# Patient Record
Sex: FEMALE | Race: WHITE | HISPANIC OR LATINO | Employment: FULL TIME | URBAN - METROPOLITAN AREA
[De-identification: names, ages, dates, MRNs, and addresses within clinical notes are randomized per-mention and may not be internally consistent; named-entity substitution may affect disease eponyms.]

---
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
## 2024-04-23 NOTE — PATIENT INSTRUCTIONS
1.  Please return to clinic at your next scheduled visit.  Contact the clinic ([phone]) at least 24 hours prior in the event you need to cancel.  2.  Do no harm to yourself or others.    3.  Avoid alcohol and drugs.    4.  Take all medications as prescribed.  Please contact the clinic with any concerns. If you are in need of medication refills, please call the clinic at [phone].    5. Should you want to get in touch with your provider, Dr. Luis Alberto Allred, please utilize [facility] or contact the office ([phone]), and staff will be able to page Dr. Allred directly.  6.  In the event you have personal crisis, contact the following crisis numbers: Suicide Prevention Hotline 1-951.829.9042; [facility] Helpline 1-249-181-YASMANI; [facility] Emergency Room [phone]; text HELLO to 408564; or 801.
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
## 2024-04-24 ENCOUNTER — OFFICE VISIT (OUTPATIENT)
Dept: PSYCHIATRY | Facility: CLINIC | Age: 19
End: 2024-04-24

## 2024-04-24 VITALS
DIASTOLIC BLOOD PRESSURE: 70 MMHG | WEIGHT: 201.6 LBS | SYSTOLIC BLOOD PRESSURE: 116 MMHG | HEIGHT: 65 IN | BODY MASS INDEX: 33.59 KG/M2 | HEART RATE: 83 BPM

## 2024-04-24 DIAGNOSIS — F51.05 INSOMNIA DUE TO MENTAL CONDITION: ICD-10-CM

## 2024-04-24 DIAGNOSIS — F31.81 MODERATE DEPRESSED BIPOLAR II DISORDER: Primary | ICD-10-CM

## 2024-04-24 DIAGNOSIS — F41.1 GENERALIZED ANXIETY DISORDER: ICD-10-CM

## 2024-04-24 RX ORDER — LAMOTRIGINE 100 MG/1
100 TABLET ORAL DAILY
Qty: 30 TABLET | Refills: 2 | Status: SHIPPED | OUTPATIENT
Start: 2024-04-24 | End: 2024-04-24 | Stop reason: SDUPTHER

## 2024-04-24 RX ORDER — LAMOTRIGINE 100 MG/1
100 TABLET ORAL DAILY
Qty: 30 TABLET | Refills: 2 | Status: SHIPPED | OUTPATIENT
Start: 2024-04-24

## 2024-04-24 RX ORDER — QUETIAPINE FUMARATE 25 MG/1
25 TABLET, FILM COATED ORAL NIGHTLY
COMMUNITY

## 2024-04-24 NOTE — TREATMENT PLAN
Multi-Disciplinary Problems (from Behavioral Health Treatment Plan)      Active Problems       Problem: Anxiety  Start Date: 04/24/24      Problem Details: The patient self-scales this problem as a 6 with 10 being the worst.          Goal Priority Start Date Expected End Date End Date    Patient will develop and implement behavioral and cognitive strategies to reduce anxiety and irrational fears. -- 04/24/24 -- --    Goal Details: Progress toward goal:  Not appropriate to rate progress toward goal since this is the initial treatment plan.          Goal Intervention Frequency Start Date End Date    Help patient explore past emotional issues in relation to present anxiety. Q Month 04/24/24 --    Intervention Details: Duration of treatment until until remission of symptoms.          Goal Intervention Frequency Start Date End Date    Help patient develop an awareness of their cognitive and physical responses to anxiety. Q Month 04/24/24 --    Intervention Details: Duration of treatment until until remission of symptoms.                  Problem: Depression  Start Date: 04/24/24      Problem Details: The patient self-scales this problem as a 4 with 10 being the worst.          Goal Priority Start Date Expected End Date End Date    Patient will demonstrate the ability to initiate new constructive life skills outside of sessions on a consistent basis. -- 04/24/24 -- --    Goal Details: Progress toward goal:  Not appropriate to rate progress toward goal since this is the initial treatment plan.          Goal Intervention Frequency Start Date End Date    Assist patient in setting attainable activities of daily living goals. PRN 04/24/24 --      Goal Intervention Frequency Start Date End Date    Provide education about depression Q Month 04/24/24 --    Intervention Details: Duration of treatment until until remission of symptoms.          Goal Intervention Frequency Start Date End Date    Assist patient in developing healthy  coping strategies. Q Month 04/24/24 --    Intervention Details: Duration of treatment until until remission of symptoms.                          Reviewed By       Luis Alberto Allred MD 04/24/24 [id number]                     I have discussed and reviewed this treatment plan with the patient.

## 2024-05-07 ENCOUNTER — OFFICE VISIT (OUTPATIENT)
Dept: PSYCHIATRY | Facility: CLINIC | Age: 19
End: 2024-05-07
Payer: MEDICAID

## 2024-05-07 VITALS
BODY MASS INDEX: 33.66 KG/M2 | HEART RATE: 80 BPM | WEIGHT: 202 LBS | DIASTOLIC BLOOD PRESSURE: 81 MMHG | SYSTOLIC BLOOD PRESSURE: 122 MMHG | HEIGHT: 65 IN

## 2024-05-07 DIAGNOSIS — F51.05 INSOMNIA DUE TO MENTAL CONDITION: ICD-10-CM

## 2024-05-07 DIAGNOSIS — F31.81 MODERATE DEPRESSED BIPOLAR II DISORDER: Primary | ICD-10-CM

## 2024-05-07 DIAGNOSIS — F41.1 GENERALIZED ANXIETY DISORDER: ICD-10-CM

## 2024-05-07 RX ORDER — QUETIAPINE FUMARATE 50 MG/1
50 TABLET, FILM COATED ORAL NIGHTLY
Qty: 30 TABLET | Refills: 2 | Status: SHIPPED | OUTPATIENT
Start: 2024-05-07

## 2024-05-30 ENCOUNTER — TELEPHONE (OUTPATIENT)
Dept: BEHAVIORAL HEALTH | Facility: CLINIC | Age: 19
End: 2024-05-30
Payer: MEDICAID

## 2024-05-30 NOTE — TELEPHONE ENCOUNTER
RELAYED MESSAGE TO PATIENTS STEP MOTHER. AGREED WITH PROVIDERS PLAN. NO FURTHER QUESTIONS OR CONCERNS AT THIS TIME

## 2024-05-30 NOTE — TELEPHONE ENCOUNTER
I'd recommend she try 1.5 (one and a half) tablets by mouth nightly first, then 2 if that doesn't work.

## 2024-05-30 NOTE — TELEPHONE ENCOUNTER
SPOKE WITH PATIENTS STEP MOM OSCAR IN REGARDS TO MEDICATION. PATIENT STATES HER SEROQUEL IS NOT HELPING SLEEP AT NIGHT. PATIENT HAS A FOLLOW UP ON 06/13/24 WITH DR ALLRED. ROUTING TO COVERING PROVIDER FOR REVIEW.   QUEtiapine (SEROquel) 50 MG tablet (05/07/2024)     NEXT FOLLOW UP   Appointment with Luis Alberto Allred MD (06/13/2024)

## 2024-06-13 ENCOUNTER — OFFICE VISIT (OUTPATIENT)
Dept: PSYCHIATRY | Facility: CLINIC | Age: 19
End: 2024-06-13
Payer: MEDICAID

## 2024-06-13 VITALS
WEIGHT: 198.4 LBS | BODY MASS INDEX: 33.05 KG/M2 | DIASTOLIC BLOOD PRESSURE: 79 MMHG | SYSTOLIC BLOOD PRESSURE: 119 MMHG | HEIGHT: 65 IN | HEART RATE: 88 BPM

## 2024-06-13 DIAGNOSIS — F31.81 MODERATE DEPRESSED BIPOLAR II DISORDER: Primary | ICD-10-CM

## 2024-06-13 DIAGNOSIS — F41.1 GENERALIZED ANXIETY DISORDER: ICD-10-CM

## 2024-06-13 DIAGNOSIS — F51.05 INSOMNIA DUE TO MENTAL CONDITION: ICD-10-CM

## 2024-06-13 RX ORDER — QUETIAPINE FUMARATE 100 MG/1
100 TABLET, FILM COATED ORAL NIGHTLY
Qty: 30 TABLET | Refills: 2 | Status: SHIPPED | OUTPATIENT
Start: 2024-06-13

## 2024-06-13 RX ORDER — LAMOTRIGINE 200 MG/1
200 TABLET ORAL DAILY
Qty: 30 TABLET | Refills: 2 | Status: SHIPPED | OUTPATIENT
Start: 2024-06-13

## 2024-06-13 NOTE — PATIENT INSTRUCTIONS
1.  Please return to clinic at your next scheduled visit.  Contact the clinic ([phone]) at least 24 hours prior in the event you need to cancel.  2.  Do no harm to yourself or others.    3.  Avoid alcohol and drugs.    4.  Take all medications as prescribed.  Please contact the clinic with any concerns. If you are in need of medication refills, please call the clinic at [phone].    5. Should you want to get in touch with your provider, Dr. Luis Alberto Allred, please utilize [employer] or contact the office ([phone]), and staff will be able to page Dr. Allred directly.  6.  In the event you have personal crisis, contact the following crisis numbers: Suicide Prevention Hotline 1-607.374.9679; [facility] Helpline 5-923-857-YASMANI; [facility] Emergency Room [phone]; text HELLO to 934687; or 714.
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
## 2024-07-16 ENCOUNTER — OFFICE VISIT (OUTPATIENT)
Dept: PSYCHIATRY | Facility: CLINIC | Age: 19
End: 2024-07-16
Payer: MEDICAID

## 2024-07-16 VITALS
BODY MASS INDEX: 32.59 KG/M2 | DIASTOLIC BLOOD PRESSURE: 76 MMHG | HEIGHT: 65 IN | HEART RATE: 84 BPM | SYSTOLIC BLOOD PRESSURE: 115 MMHG | WEIGHT: 195.6 LBS

## 2024-07-16 DIAGNOSIS — F31.81 MODERATE DEPRESSED BIPOLAR II DISORDER: Primary | ICD-10-CM

## 2024-07-16 DIAGNOSIS — F41.1 GENERALIZED ANXIETY DISORDER: ICD-10-CM

## 2024-07-16 DIAGNOSIS — F51.05 INSOMNIA DUE TO MENTAL CONDITION: ICD-10-CM

## 2024-07-16 PROCEDURE — 90833 PSYTX W PT W E/M 30 MIN: CPT | Performed by: STUDENT IN AN ORGANIZED HEALTH CARE EDUCATION/TRAINING PROGRAM

## 2024-07-16 PROCEDURE — 99214 OFFICE O/P EST MOD 30 MIN: CPT | Performed by: STUDENT IN AN ORGANIZED HEALTH CARE EDUCATION/TRAINING PROGRAM

## 2024-07-16 PROCEDURE — 1160F RVW MEDS BY RX/DR IN RCRD: CPT | Performed by: STUDENT IN AN ORGANIZED HEALTH CARE EDUCATION/TRAINING PROGRAM

## 2024-07-16 PROCEDURE — 1159F MED LIST DOCD IN RCRD: CPT | Performed by: STUDENT IN AN ORGANIZED HEALTH CARE EDUCATION/TRAINING PROGRAM

## 2024-07-16 RX ORDER — HYDROXYZINE HYDROCHLORIDE 25 MG/1
25 TABLET, FILM COATED ORAL 3 TIMES DAILY PRN
Qty: 90 TABLET | Refills: 2 | Status: SHIPPED | OUTPATIENT
Start: 2024-07-16
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
## 2024-07-16 NOTE — PATIENT INSTRUCTIONS
1.  Please return to clinic at your next scheduled visit.  Contact the clinic ([phone]) at least 24 hours prior in the event you need to cancel.  2.  Do no harm to yourself or others.    3.  Avoid alcohol and drugs.    4.  Take all medications as prescribed.  Please contact the clinic with any concerns. If you are in need of medication refills, please call the clinic at [phone].    5. Should you want to get in touch with your provider, Dr. Luis Alberto Allred, please utilize [facility] or contact the office ([phone]), and staff will be able to page Dr. Allred directly.  6.  In the event you have personal crisis, contact the following crisis numbers: Suicide Prevention Hotline 1-765.558.6306; [facility]MAN[facility] Helpline 1-282-202-YASMANI; [facility] Emergency Room [phone]; text HELLO to 658255; or 796.

## 2024-07-29 ENCOUNTER — HOSPITAL ENCOUNTER (EMERGENCY)
Facility: HOSPITAL | Age: 19
Discharge: HOME | End: 2024-07-29
Attending: EMERGENCY MEDICINE
Payer: MEDICAID

## 2024-07-29 VITALS
DIASTOLIC BLOOD PRESSURE: 77 MMHG | HEART RATE: 83 BPM | BODY MASS INDEX: 33.43 KG/M2 | OXYGEN SATURATION: 98 % | TEMPERATURE: 97.5 F | SYSTOLIC BLOOD PRESSURE: 106 MMHG | HEIGHT: 65 IN | WEIGHT: 200.62 LBS | RESPIRATION RATE: 16 BRPM

## 2024-07-29 DIAGNOSIS — R51.9 NONINTRACTABLE HEADACHE, UNSPECIFIED CHRONICITY PATTERN, UNSPECIFIED HEADACHE TYPE: Primary | ICD-10-CM

## 2024-07-29 DIAGNOSIS — K21.9 GASTROESOPHAGEAL REFLUX DISEASE, UNSPECIFIED WHETHER ESOPHAGITIS PRESENT: ICD-10-CM

## 2024-07-29 LAB
ALBUMIN SERPL-MCNC: 4.4 G/DL (ref 3.5–5.2)
ALBUMIN/GLOB SERPL: 1.5 G/DL
ALP SERPL-CCNC: 158 U/L (ref 39–117)
ALT SERPL W P-5'-P-CCNC: 13 U/L (ref 1–33)
ANION GAP SERPL CALCULATED.3IONS-SCNC: 10.9 MMOL/L (ref 5–15)
AST SERPL-CCNC: 26 U/L (ref 1–32)
BASOPHILS # BLD AUTO: 0.02 10*3/MM3 (ref 0–0.2)
BASOPHILS NFR BLD AUTO: 0.4 % (ref 0–1.5)
BILIRUB SERPL-MCNC: 0.2 MG/DL (ref 0–1.2)
BILIRUB UR QL STRIP: NEGATIVE
BUN SERPL-MCNC: 9 MG/DL (ref 6–20)
BUN/CREAT SERPL: 12.2 (ref 7–25)
CALCIUM SPEC-SCNC: 9.4 MG/DL (ref 8.6–10.5)
CHLORIDE SERPL-SCNC: 104 MMOL/L (ref 98–107)
CLARITY UR: CLEAR
CO2 SERPL-SCNC: 25.1 MMOL/L (ref 22–29)
COLOR UR: YELLOW
CREAT SERPL-MCNC: 0.74 MG/DL (ref 0.57–1)
DEPRECATED RDW RBC AUTO: 43.7 FL (ref 37–54)
EGFRCR SERPLBLD CKD-EPI 2021: 118.2 ML/MIN/1.73
EOSINOPHIL # BLD AUTO: 0.31 10*3/MM3 (ref 0–0.4)
EOSINOPHIL NFR BLD AUTO: 6.8 % (ref 0.3–6.2)
ERYTHROCYTE [DISTWIDTH] IN BLOOD BY AUTOMATED COUNT: 13.6 % (ref 12.3–15.4)
FLUAV SUBTYP SPEC NAA+PROBE: NOT DETECTED
FLUBV RNA ISLT QL NAA+PROBE: NOT DETECTED
GLOBULIN UR ELPH-MCNC: 2.9 GM/DL
GLUCOSE SERPL-MCNC: 100 MG/DL (ref 65–99)
GLUCOSE UR STRIP-MCNC: NEGATIVE MG/DL
HCT VFR BLD AUTO: 42.5 % (ref 34–46.6)
HGB BLD-MCNC: 13.4 G/DL (ref 12–15.9)
HGB UR QL STRIP.AUTO: NEGATIVE
HOLD SPECIMEN: NORMAL
HOLD SPECIMEN: NORMAL
IMM GRANULOCYTES # BLD AUTO: 0.01 10*3/MM3 (ref 0–0.05)
IMM GRANULOCYTES NFR BLD AUTO: 0.2 % (ref 0–0.5)
KETONES UR QL STRIP: NEGATIVE
LEUKOCYTE ESTERASE UR QL STRIP.AUTO: NEGATIVE
LYMPHOCYTES # BLD AUTO: 1.17 10*3/MM3 (ref 0.7–3.1)
LYMPHOCYTES NFR BLD AUTO: 25.6 % (ref 19.6–45.3)
MCH RBC QN AUTO: 27.6 PG (ref 26.6–33)
MCHC RBC AUTO-ENTMCNC: 31.5 G/DL (ref 31.5–35.7)
MCV RBC AUTO: 87.6 FL (ref 79–97)
MONOCYTES # BLD AUTO: 0.37 10*3/MM3 (ref 0.1–0.9)
MONOCYTES NFR BLD AUTO: 8.1 % (ref 5–12)
NEUTROPHILS NFR BLD AUTO: 2.69 10*3/MM3 (ref 1.7–7)
NEUTROPHILS NFR BLD AUTO: 58.9 % (ref 42.7–76)
NITRITE UR QL STRIP: NEGATIVE
NRBC BLD AUTO-RTO: 0 /100 WBC (ref 0–0.2)
PH UR STRIP.AUTO: 6.5 [PH] (ref 5–8)
PLATELET # BLD AUTO: 216 10*3/MM3 (ref 140–450)
PMV BLD AUTO: 10.1 FL (ref 6–12)
POTASSIUM SERPL-SCNC: 4.1 MMOL/L (ref 3.5–5.2)
PROT SERPL-MCNC: 7.3 G/DL (ref 6–8.5)
PROT UR QL STRIP: NEGATIVE
QT INTERVAL: 361 MS
QTC INTERVAL: 424 MS
RBC # BLD AUTO: 4.85 10*6/MM3 (ref 3.77–5.28)
RSV RNA NPH QL NAA+NON-PROBE: NOT DETECTED
SARS-COV-2 RNA RESP QL NAA+PROBE: NOT DETECTED
SODIUM SERPL-SCNC: 140 MMOL/L (ref 136–145)
SP GR UR STRIP: 1.02 (ref 1–1.03)
UROBILINOGEN UR QL STRIP: NORMAL
WBC NRBC COR # BLD AUTO: 4.57 10*3/MM3 (ref 3.4–10.8)
WHOLE BLOOD HOLD COAG: NORMAL
WHOLE BLOOD HOLD SPECIMEN: NORMAL

## 2024-07-29 PROCEDURE — 25810000003 SODIUM CHLORIDE 0.9 % SOLUTION

## 2024-07-29 PROCEDURE — 99284 EMERGENCY DEPT VISIT MOD MDM: CPT

## 2024-07-29 PROCEDURE — 81003 URINALYSIS AUTO W/O SCOPE: CPT | Performed by: EMERGENCY MEDICINE

## 2024-07-29 PROCEDURE — 85025 COMPLETE CBC W/AUTO DIFF WBC: CPT | Performed by: EMERGENCY MEDICINE

## 2024-07-29 PROCEDURE — 25010000002 DEXAMETHASONE SODIUM PHOSPHATE 10 MG/ML SOLUTION

## 2024-07-29 PROCEDURE — 96374 THER/PROPH/DIAG INJ IV PUSH: CPT

## 2024-07-29 PROCEDURE — 87637 SARSCOV2&INF A&B&RSV AMP PRB: CPT

## 2024-07-29 PROCEDURE — 93005 ELECTROCARDIOGRAM TRACING: CPT | Performed by: EMERGENCY MEDICINE

## 2024-07-29 PROCEDURE — 80053 COMPREHEN METABOLIC PANEL: CPT | Performed by: EMERGENCY MEDICINE

## 2024-07-29 PROCEDURE — 96375 TX/PRO/DX INJ NEW DRUG ADDON: CPT

## 2024-07-29 PROCEDURE — 25010000002 KETOROLAC TROMETHAMINE PER 15 MG

## 2024-07-29 RX ORDER — LIDOCAINE HYDROCHLORIDE 20 MG/ML
10 SOLUTION OROPHARYNGEAL ONCE
Status: COMPLETED | OUTPATIENT
Start: 2024-07-29 | End: 2024-07-29

## 2024-07-29 RX ORDER — DEXAMETHASONE SODIUM PHOSPHATE 10 MG/ML
10 INJECTION, SOLUTION INTRAMUSCULAR; INTRAVENOUS ONCE
Status: COMPLETED | OUTPATIENT
Start: 2024-07-29 | End: 2024-07-29

## 2024-07-29 RX ORDER — FAMOTIDINE 20 MG/1
20 TABLET, FILM COATED ORAL DAILY
Qty: 10 TABLET | Refills: 0 | Status: SHIPPED | OUTPATIENT
Start: 2024-07-29 | End: 2024-08-08

## 2024-07-29 RX ORDER — ALUMINA, MAGNESIA, AND SIMETHICONE 2400; 2400; 240 MG/30ML; MG/30ML; MG/30ML
15 SUSPENSION ORAL ONCE
Status: COMPLETED | OUTPATIENT
Start: 2024-07-29 | End: 2024-07-29

## 2024-07-29 RX ORDER — NAPROXEN 500 MG/1
500 TABLET ORAL 2 TIMES DAILY PRN
Qty: 20 TABLET | Refills: 0 | Status: SHIPPED | OUTPATIENT
Start: 2024-07-29

## 2024-07-29 RX ORDER — KETOROLAC TROMETHAMINE 30 MG/ML
30 INJECTION, SOLUTION INTRAMUSCULAR; INTRAVENOUS ONCE
Status: COMPLETED | OUTPATIENT
Start: 2024-07-29 | End: 2024-07-29

## 2024-07-29 RX ORDER — SODIUM CHLORIDE 0.9 % (FLUSH) 0.9 %
10 SYRINGE (ML) INJECTION AS NEEDED
Status: DISCONTINUED | OUTPATIENT
Start: 2024-07-29 | End: 2024-07-29 | Stop reason: HOSPADM

## 2024-07-29 RX ADMIN — ALUMINUM HYDROXIDE, MAGNESIUM HYDROXIDE, DIMETHICONE 15 ML: 400; 400; 40 SUSPENSION ORAL at 12:07

## 2024-07-29 RX ADMIN — SODIUM CHLORIDE 1000 ML: 9 INJECTION, SOLUTION INTRAVENOUS at 10:43

## 2024-07-29 RX ADMIN — LIDOCAINE HYDROCHLORIDE 10 ML: 20 SOLUTION ORAL; TOPICAL at 12:07

## 2024-07-29 RX ADMIN — DEXAMETHASONE SODIUM PHOSPHATE 10 MG: 10 INJECTION INTRAMUSCULAR; INTRAVENOUS at 10:44

## 2024-07-29 RX ADMIN — KETOROLAC TROMETHAMINE 30 MG: 30 INJECTION, SOLUTION INTRAMUSCULAR; INTRAVENOUS at 10:44

## 2024-07-29 NOTE — DISCHARGE INSTRUCTIONS
Please note that all your lab work was within normal limits.  Based on your symptoms the numbness and tingling you are experiencing could be from a musculoskeletal/compression of a nerve in your neck.  I recommend speaking with your primary care provider about a possible MRI if you continue to have the symptoms.  Additionally take all your regular medications as prescribed.  If you develop the worst headache of your life, feel as if you are becoming confused, experience any one-sided weakness, or feel as if you are having a hard time finding your words and speaking please return to the ER immediately.  Otherwise follow-up with your primary care provider in 2 to 3 days.

## 2024-07-29 NOTE — ED PROVIDER NOTES
Time: 10:19 AM EDT  Date of encounter:  7/29/2024  Room number: 27/27  Independent Historian/Clinical History and Information was obtained by:   Patient    History is limited by: N/A    Chief Complaint: headache and dizziness       History of Present Illness:  Patient is a 19 y.o. year old female who presents to the emergency department for evaluation of headache, dizziness, and a burning sensation in her stomach.  Symptoms are reported to have started 2 to 3 days ago.  She has a history of migraines with upper extremity numbness and tingling    HPI    Patient Care Team  Primary Care Provider: Kim Cox APRN    Past Medical History:     Allergies   Allergen Reactions    Hydrocodone Hives    Adhesive Tape Hives     Medical and surgical tape causes itching and hives      Past Medical History:   Diagnosis Date    Anxiety     Bipolar depression Decemeber 2021     Past Surgical History:   Procedure Laterality Date    APPENDECTOMY       Family History   Problem Relation Age of Onset    Self-Injurious Behavior  Mother     Drug abuse Mother     Depression Mother     Bipolar disorder Mother     Anxiety disorder Mother     Alcohol abuse Father     No Known Problems Sister     ADD / ADHD Brother     Diabetes Maternal Aunt     Drug abuse Paternal Aunt     Hypertension Maternal Uncle     Thyroid disease Maternal Uncle     No Known Problems Paternal Uncle     Dementia Maternal Grandfather     Diabetes Maternal Grandfather     No Known Problems Maternal Grandmother     No Known Problems Paternal Grandfather     No Known Problems Paternal Grandmother     No Known Problems Cousin     No Known Problems Other     Depression Half-Sister     Anxiety disorder Half-Sister     OCD Neg Hx     Paranoid behavior Neg Hx     Schizophrenia Neg Hx     Seizures Neg Hx     Suicide Attempts Neg Hx        Home Medications:  Prior to Admission medications    Medication Sig Start Date End Date Taking? Authorizing Provider   hydrOXYzine  "(ATARAX) 25 MG tablet Take 1 tablet by mouth 3 (Three) Times a Day As Needed for Anxiety. 7/16/24   Luis Alberto Allred MD   lamoTRIgine (LaMICtal) 200 MG tablet Take 1 tablet by mouth Daily. 6/13/24   Luis Alberto Allred MD   QUEtiapine (SEROquel) 100 MG tablet Take 1 tablet by mouth Every Night. ONCE AT NIGHT 6/13/24   Luis Alberto Allred MD        Social History:   Social History     Tobacco Use    Smoking status: Never    Smokeless tobacco: Never   Vaping Use    Vaping status: Never Used   Substance Use Topics    Alcohol use: Never    Drug use: Never         Review of Systems:  Review of Systems   Constitutional:  Negative for chills and fever.   HENT:  Negative for congestion, ear pain and sore throat.    Eyes:  Negative for pain.   Respiratory:  Negative for cough, chest tightness and shortness of breath.    Cardiovascular:  Negative for chest pain.   Gastrointestinal:  Negative for abdominal pain, diarrhea, nausea and vomiting.        Burning in stomach   Genitourinary:  Negative for flank pain and hematuria.   Musculoskeletal:  Negative for joint swelling.   Skin:  Negative for pallor.   Neurological:  Positive for headaches. Negative for seizures.   All other systems reviewed and are negative.       Physical Exam:  /77   Pulse 83   Temp 97.5 °F (36.4 °C) (Oral)   Resp 16   Ht 165.1 cm (65\")   Wt 91 kg (200 lb 9.9 oz)   LMP 07/22/2024 (Approximate)   SpO2 98%   Breastfeeding No   BMI 33.38 kg/m²     Physical Exam  Vitals and nursing note reviewed.   Constitutional:       General: She is not in acute distress.     Appearance: Normal appearance. She is not ill-appearing, toxic-appearing or diaphoretic.   HENT:      Head: Normocephalic and atraumatic.      Mouth/Throat:      Mouth: Mucous membranes are moist.   Eyes:      General: No scleral icterus.     Extraocular Movements: Extraocular movements intact.      Pupils: Pupils are equal, round, and reactive to light.   Cardiovascular:      Rate and Rhythm: " Normal rate and regular rhythm.      Pulses: Normal pulses.      Heart sounds: Normal heart sounds.   Pulmonary:      Effort: Pulmonary effort is normal. No respiratory distress.      Breath sounds: Normal breath sounds. No stridor. No wheezing, rhonchi or rales.   Chest:      Chest wall: No tenderness.   Abdominal:      General: Abdomen is flat. There is no distension.      Palpations: Abdomen is soft.      Tenderness: There is no abdominal tenderness. There is no guarding.   Musculoskeletal:         General: No tenderness. Normal range of motion.      Cervical back: Normal range of motion and neck supple. No rigidity or tenderness.   Skin:     General: Skin is warm and dry.      Coloration: Skin is not jaundiced or pale.      Findings: No bruising, erythema, lesion or rash.   Neurological:      Mental Status: She is alert and oriented to person, place, and time. Mental status is at baseline.      Sensory: No sensory deficit.   Psychiatric:         Mood and Affect: Mood normal.         Behavior: Behavior normal.         Thought Content: Thought content normal.         Judgment: Judgment normal.                  Procedures:  Procedures      Medical Decision Making:      Comorbidities that affect care:    Migraines, bipolar,    External Notes reviewed:    Previous Radiological Studies: I reviewed the last cervical spine and lumbar spine x-rays that patient had completed which were on 2/16/2022 also reviewed her labs visit with her psychiatrist which occurred on 7/16/2024      The following orders were placed and all results were independently analyzed by me:  Orders Placed This Encounter   Procedures    COVID PRE-OP / PRE-PROCEDURE SCREENING ORDER (NO ISOLATION) - Swab, Nasopharynx    COVID-19, FLU A/B, RSV PCR 1 HR TAT - Swab, Nasopharynx    [place] Draw    Comprehensive Metabolic Panel    Urinalysis With Microscopic If Indicated (No Culture) - Urine, Clean Catch    CBC Auto Differential    NPO Diet NPO Type: Strict  NPO    Undress & Gown    Continuous Pulse Oximetry    Vital Signs    Orthostatic Blood Pressure    Oxygen Therapy- Nasal Cannula; Titrate 1-6 LPM Per SpO2; 90 - 95%    POC Glucose Once    ECG 12 Lead ED Triage Standing Order; Weak / Dizzy / AMS    Insert Peripheral IV    Fall Precautions    CBC & Differential    Green Top (Gel)    Lavender Top    Gold Top - SST    Light Blue Top       Medications Given in the Emergency Department:  Medications   sodium chloride 0.9 % flush 10 mL (has no administration in time range)   sodium chloride 0.9 % bolus 1,000 mL (0 mL Intravenous Stopped 7/29/24 1208)   ketorolac (TORADOL) injection 30 mg (30 mg Intravenous Given 7/29/24 1044)   dexAMETHasone sodium phosphate injection 10 mg (10 mg Intravenous Given 7/29/24 1044)   aluminum-magnesium hydroxide-simethicone (MAALOX MAX) 400-400-40 MG/5ML suspension 15 mL (15 mL Oral Given 7/29/24 1207)   Lidocaine Viscous HCl (XYLOCAINE) 2 % solution 10 mL (10 mL Mouth/Throat Given 7/29/24 1207)        ED Course:    ED Course as of 07/29/24 1224   Mon Jul 29, 2024   1159 Pt states she does feel some better. He discussed out pt MRI. She confirms that she does have a PCP who she can follow-up with .  [MS]      ED Course User Index  [MS] Karen Costa, ANA       Labs:    Lab Results (last 24 hours)       Procedure Component Value Units Date/Time    CBC & Differential [[id number]]  (Abnormal) Collected: 07/29/24 1041    Specimen: Blood Updated: 07/29/24 1102    Narrative:      The following orders were created for panel order CBC & Differential.  Procedure                               Abnormality         Status                     ---------                               -----------         ------                     CBC Auto Differential[[id number]]        Abnormal            Final result                 Please view results for these tests on the individual orders.    Comprehensive Metabolic Panel [[id number]]  (Abnormal) Collected:  07/29/24 1041    Specimen: Blood Updated: 07/29/24 1115     Glucose 100 mg/dL      BUN 9 mg/dL      Creatinine 0.74 mg/dL      Sodium 140 mmol/L      Potassium 4.1 mmol/L      Chloride 104 mmol/L      CO2 25.1 mmol/L      Calcium 9.4 mg/dL      Total Protein 7.3 g/dL      Albumin 4.4 g/dL      ALT (SGPT) 13 U/L      AST (SGOT) 26 U/L      Alkaline Phosphatase 158 U/L      Total Bilirubin 0.2 mg/dL      Globulin 2.9 gm/dL      A/G Ratio 1.5 g/dL      BUN/Creatinine Ratio 12.2     Anion Gap 10.9 mmol/L      eGFR 118.2 mL/min/1.73     Narrative:      GFR Normal >60  Chronic Kidney Disease <60  Kidney Failure <15      CBC Auto Differential [[id number]]  (Abnormal) Collected: 07/29/24 1041    Specimen: Blood Updated: 07/29/24 1102     WBC 4.57 10*3/mm3      RBC 4.85 10*6/mm3      Hemoglobin 13.4 g/dL      Hematocrit 42.5 %      MCV 87.6 fL      MCH 27.6 pg      MCHC 31.5 g/dL      RDW 13.6 %      RDW-SD 43.7 fl      MPV 10.1 fL      Platelets 216 10*3/mm3      Neutrophil % 58.9 %      Lymphocyte % 25.6 %      Monocyte % 8.1 %      Eosinophil % 6.8 %      Basophil % 0.4 %      Immature Grans % 0.2 %      Neutrophils, Absolute 2.69 10*3/mm3      Lymphocytes, Absolute 1.17 10*3/mm3      Monocytes, Absolute 0.37 10*3/mm3      Eosinophils, Absolute 0.31 10*3/mm3      Basophils, Absolute 0.02 10*3/mm3      Immature Grans, Absolute 0.01 10*3/mm3      nRBC 0.0 /100 WBC     COVID PRE-OP / PRE-PROCEDURE SCREENING ORDER (NO ISOLATION) - Swab, Nasopharynx [[id number]]  (Normal) Collected: 07/29/24 1045    Specimen: Swab from Nasopharynx Updated: 07/29/24 1145    Narrative:      The following orders were created for panel order COVID PRE-OP / PRE-PROCEDURE SCREENING ORDER (NO ISOLATION) - Swab, Nasopharynx.  Procedure                               Abnormality         Status                     ---------                               -----------         ------                     COVID-19, FLU A/B, RSV P...[[id number]]  Normal               Final result                 Please view results for these tests on the individual orders.    COVID-19, FLU A/B, RSV PCR 1 HR TAT - Swab, Nasopharynx [[id number]]  (Normal) Collected: 07/29/24 1045    Specimen: Swab from Nasopharynx Updated: 07/29/24 1145     COVID19 Not Detected     Influenza A PCR Not Detected     Influenza B PCR Not Detected     RSV, PCR Not Detected    Narrative:      Fact sheet for providers: https://www.fda.gov/media/070029/download    Fact sheet for patients: https://www.fda.gov/media/776941/download    Test performed by PCR.    Urinalysis With Microscopic If Indicated (No Culture) - Urine, Clean Catch [[id number]]  (Normal) Collected: 07/29/24 1106    Specimen: Urine, Clean Catch Updated: 07/29/24 1118     Color, UA Yellow     Appearance, UA Clear     pH, UA 6.5     Specific Gravity, UA 1.022     Glucose, UA Negative     Ketones, UA Negative     Bilirubin, UA Negative     Blood, UA Negative     Protein, UA Negative     Leuk Esterase, UA Negative     Nitrite, UA Negative     Urobilinogen, UA 1.0 E.U./dL    Narrative:      Urine microscopic not indicated.             Imaging:    No Radiology Exams Resulted Within Past 24 Hours      Differential Diagnosis and Discussion:    Headache: Differential diagnosis includes but is not limited to migraine, cluster headache, hypertension, tumor, subarachnoid bleeding, pseudotumor cerebri, temporal arteritis, infections, tension headache, and TMJ syndrome.    All labs were reviewed and interpreted by me.    MDM               Patient Care Considerations:    CT HEAD: I considered ordering a noncontrast CT of the head, however patient has a history of headaches, she has had no recent injuries that may suggest TBI, she is awake alert and oriented x 3 and follows all commands.  There is no aphasia or confusion and there is also no any unilateral weakness      Consultants/Shared Management Plan:    None    Social Determinants of Health:    Patient is  independent, reliable, and has access to care.       Disposition and Care Coordination:    Discharged: The patient is suitable and stable for discharge with no need for consideration of admission.    I have explained the patient´s condition, diagnoses and treatment plan based on the information available to me at this time. I have answered questions and addressed any concerns. The patient has a good  understanding of the patient´s diagnosis, condition, and treatment plan as can be expected at this point. The vital signs have been stable. The patient´s condition is stable and appropriate for discharge from the emergency department.      The patient will pursue further outpatient evaluation with the primary care physician or other designated or consulting physician as outlined in the discharge instructions. They are agreeable to this plan of care and follow-up instructions have been explained in detail. The patient has received these instructions in written format and has expressed an understanding of the discharge instructions. The patient is aware that any significant change in condition or worsening of symptoms should prompt an immediate return to this or the closest emergency department or call to 911.    Final diagnoses:   Nonintractable headache, unspecified chronicity pattern, unspecified headache type   Gastroesophageal reflux disease, unspecified whether esophagitis present        ED Disposition       ED Disposition   Discharge    Condition   Stable    Comment   --               This medical record created using voice recognition software.       Karen Costa, APRN  07/29/24 [id number]

## 2024-08-16 ENCOUNTER — OFFICE VISIT (OUTPATIENT)
Dept: PSYCHIATRY | Facility: CLINIC | Age: 19
End: 2024-08-16
Payer: MEDICAID

## 2024-08-16 VITALS
SYSTOLIC BLOOD PRESSURE: 120 MMHG | DIASTOLIC BLOOD PRESSURE: 83 MMHG | HEIGHT: 65 IN | WEIGHT: 199 LBS | HEART RATE: 83 BPM | BODY MASS INDEX: 33.15 KG/M2

## 2024-08-16 DIAGNOSIS — F31.81 MODERATE DEPRESSED BIPOLAR II DISORDER: Primary | ICD-10-CM

## 2024-08-16 DIAGNOSIS — F51.05 INSOMNIA DUE TO MENTAL CONDITION: ICD-10-CM

## 2024-08-16 DIAGNOSIS — F41.1 GENERALIZED ANXIETY DISORDER: ICD-10-CM

## 2024-08-16 RX ORDER — LAMOTRIGINE 200 MG/1
200 TABLET ORAL DAILY
Qty: 30 TABLET | Refills: 5 | Status: SHIPPED | OUTPATIENT
Start: 2024-08-16

## 2024-08-16 RX ORDER — QUETIAPINE FUMARATE 100 MG/1
100 TABLET, FILM COATED ORAL NIGHTLY
Qty: 30 TABLET | Refills: 5 | Status: SHIPPED | OUTPATIENT
Start: 2024-08-16

## 2024-08-16 NOTE — PLAN OF CARE
[facility] psychotherapy to help manage depression and anxiety related to family conflict and relationships.

## 2024-08-16 NOTE — TREATMENT PLAN
Multi-Disciplinary Problems (from Behavioral Health Treatment Plan)      Active Problems       Problem: Anxiety  Start Date: 08/16/24      Problem Details: The patient self-scales this problem as a 3 with 10 being the worst.    related to family conflict and relationships        Goal Priority Start Date Expected End Date End Date    Patient will develop and implement behavioral and cognitive strategies to reduce anxiety and irrational fears. -- 08/16/24 02/14/25 --    Goal Details: Progress toward goal:  improving          Goal Intervention Frequency Start Date End Date    Help patient explore past emotional issues in relation to present anxiety. Q Month 08/16/24 --    Intervention Details: Duration of treatment until remission of symptoms.          Goal Intervention Frequency Start Date End Date    Help patient develop an awareness of their cognitive and physical responses to anxiety. Q Month 08/16/24 --    Intervention Details: Duration of treatment until remission of symptoms.                  Problem: Depression  Start Date: 08/16/24      Problem Details: The patient self-scales this problem as a 3 with 10 being the worst.    related to family conflict and relationships        Goal Priority Start Date Expected End Date End Date    Patient will demonstrate the ability to initiate new constructive life skills outside of sessions on a consistent basis. -- 08/16/24 02/14/25 --    Goal Details: Progress toward goal:  improving          Goal Intervention Frequency Start Date End Date    Assist patient in setting attainable activities of daily living goals. PRN 08/16/24 --      Goal Intervention Frequency Start Date End Date    Assist patient in developing healthy coping strategies. Q Month 08/16/24 --    Intervention Details: Duration of treatment until remission of symptoms.                          Reviewed By       Luis Alberto Allred MD 08/16/24 7445                     I have discussed and reviewed this treatment  plan with the patient.

## 2024-08-16 NOTE — PATIENT INSTRUCTIONS
1.  Please return to clinic at your next scheduled visit.  Contact the clinic ([phone]) at least 24 hours prior in the event you need to cancel.  2.  Do no harm to yourself or others.    3.  Avoid alcohol and drugs.    4.  Take all medications as prescribed.  Please contact the clinic with any concerns. If you are in need of medication refills, please call the clinic at [phone].    5. Should you want to get in touch with your provider, Dr. Luis Alberto Allred, please utilize [employer] or contact the office ([phone]), and staff will be able to page Dr. Allred directly.  6.  In the event you have personal crisis, contact the following crisis numbers: Suicide Prevention Hotline 1-331.701.7301; [facility] Helpline 3-553-817-YASMANI; [facility] Emergency Room [phone]; text HELLO to 406442; or 369.
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
## 2024-09-23 ENCOUNTER — OFFICE VISIT (OUTPATIENT)
Dept: PSYCHIATRY | Facility: CLINIC | Age: 19
End: 2024-09-23
Payer: MEDICAID

## 2024-09-23 VITALS
SYSTOLIC BLOOD PRESSURE: 106 MMHG | HEIGHT: 65 IN | DIASTOLIC BLOOD PRESSURE: 72 MMHG | HEART RATE: 90 BPM | WEIGHT: 203 LBS | BODY MASS INDEX: 33.82 KG/M2

## 2024-09-23 DIAGNOSIS — F31.81 MODERATE DEPRESSED BIPOLAR II DISORDER: Primary | ICD-10-CM

## 2024-09-23 DIAGNOSIS — F51.05 INSOMNIA DUE TO MENTAL CONDITION: ICD-10-CM

## 2024-09-23 DIAGNOSIS — F41.1 GENERALIZED ANXIETY DISORDER: ICD-10-CM

## 2024-09-23 PROCEDURE — 90833 PSYTX W PT W E/M 30 MIN: CPT | Performed by: STUDENT IN AN ORGANIZED HEALTH CARE EDUCATION/TRAINING PROGRAM

## 2024-09-23 PROCEDURE — 1160F RVW MEDS BY RX/DR IN RCRD: CPT | Performed by: STUDENT IN AN ORGANIZED HEALTH CARE EDUCATION/TRAINING PROGRAM

## 2024-09-23 PROCEDURE — 99214 OFFICE O/P EST MOD 30 MIN: CPT | Performed by: STUDENT IN AN ORGANIZED HEALTH CARE EDUCATION/TRAINING PROGRAM

## 2024-09-23 PROCEDURE — 1159F MED LIST DOCD IN RCRD: CPT | Performed by: STUDENT IN AN ORGANIZED HEALTH CARE EDUCATION/TRAINING PROGRAM

## 2024-09-30 ENCOUNTER — TELEPHONE (OUTPATIENT)
Dept: FAMILY MEDICINE CLINIC | Facility: CLINIC | Age: 19
End: 2024-09-30

## 2024-09-30 NOTE — TELEPHONE ENCOUNTER
LVMTCB    Patient missed their appointment scheduled on 9/30 with Maki    Would patient like to be rescheduled?    No show letter sent to patient either via [employer] or mail.     HUB TO SHARE AND RELAY
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
## 2024-10-31 NOTE — PATIENT INSTRUCTIONS
1.  Please return to clinic at your next scheduled visit.  Contact the clinic ([phone]) at least 24 hours prior in the event you need to cancel.  2.  Do no harm to yourself or others.    3.  Avoid alcohol and drugs.    4.  Take all medications as prescribed.  Please contact the clinic with any concerns. If you are in need of medication refills, please call the clinic at [phone].    5. Should you want to get in touch with your provider, Dr. Luis Alberto Allred, please utilize [employer] or contact the office ([phone]), and staff will be able to page Dr. Allred directly.  6.  In the event you have personal crisis, contact the following crisis numbers: Suicide Prevention Hotline 1-433.874.1871; [facility] Helpline 3-815-576-MVAI; [facility] Emergency Room [phone]; text HELLO to 640676; or 110.     Light box/Happy Light/Light Therapy: Obtain a light box, 10,000 lux, put the box about 1 foot away from you, facing you at an angle (not directly), use it daily, right after waking up, for 1 hour daily. Don't stare directly at it. Read, eat, watch tv, etc. Use from September through March. Call your insurance company to see if they can reimburse you for the cost. Available at big box retailers. I used [employer] to get a [employer] one.

## 2024-11-01 ENCOUNTER — OFFICE VISIT (OUTPATIENT)
Dept: PSYCHIATRY | Facility: CLINIC | Age: 19
End: 2024-11-01
Payer: MEDICAID

## 2024-11-01 VITALS
SYSTOLIC BLOOD PRESSURE: 131 MMHG | HEIGHT: 65 IN | HEART RATE: 95 BPM | DIASTOLIC BLOOD PRESSURE: 84 MMHG | BODY MASS INDEX: 33.49 KG/M2 | WEIGHT: 201 LBS | OXYGEN SATURATION: 100 %

## 2024-11-01 DIAGNOSIS — F31.81 MODERATE DEPRESSED BIPOLAR II DISORDER: Primary | ICD-10-CM

## 2024-11-01 DIAGNOSIS — F41.1 GENERALIZED ANXIETY DISORDER: ICD-10-CM

## 2024-11-01 DIAGNOSIS — F51.05 INSOMNIA DUE TO MENTAL CONDITION: ICD-10-CM

## 2024-11-06 ENCOUNTER — TELEPHONE (OUTPATIENT)
Dept: PSYCHIATRY | Facility: CLINIC | Age: 19
End: 2024-11-06
Payer: MEDICAID

## 2024-11-06 NOTE — TELEPHONE ENCOUNTER
PATIENT WAS REFERRED OUT TO THE VIRTUAL CLINIC, REFERRAL WAS SENT BACK STATING SEVERAL ATTEMPT TO CONTACT PATIENT TO SCHEDULE AND WAS UNSUCCESSFUL   CLOSING OUT REFERRAL ORDER

## 2024-11-06 NOTE — TELEPHONE ENCOUNTER
Thank you    PATIENT WAS REFERRED OUT TO THE VIRTUAL CLINIC, REFERRAL WAS SENT BACK STATING SEVERAL ATTEMPT TO CONTACT PATIENT TO SCHEDULE AND WAS UNSUCCESSFUL   CLOSING OUT REFERRAL ORDER

## 2024-11-26 ENCOUNTER — OFFICE VISIT (OUTPATIENT)
Dept: FAMILY MEDICINE CLINIC | Facility: CLINIC | Age: 19
End: 2024-11-26
Payer: MEDICAID

## 2024-11-26 VITALS
WEIGHT: 199.8 LBS | HEIGHT: 65 IN | OXYGEN SATURATION: 96 % | BODY MASS INDEX: 33.29 KG/M2 | DIASTOLIC BLOOD PRESSURE: 72 MMHG | SYSTOLIC BLOOD PRESSURE: 120 MMHG | HEART RATE: 81 BPM | TEMPERATURE: 97.4 F

## 2024-11-26 DIAGNOSIS — G89.29 CHRONIC BILATERAL LOW BACK PAIN WITH BILATERAL SCIATICA: ICD-10-CM

## 2024-11-26 DIAGNOSIS — Z00.00 ANNUAL PHYSICAL EXAM: Primary | ICD-10-CM

## 2024-11-26 DIAGNOSIS — Z23 NEED FOR INFLUENZA VACCINATION: ICD-10-CM

## 2024-11-26 DIAGNOSIS — G43.111 INTRACTABLE MIGRAINE WITH AURA WITH STATUS MIGRAINOSUS: ICD-10-CM

## 2024-11-26 DIAGNOSIS — M54.41 CHRONIC BILATERAL LOW BACK PAIN WITH BILATERAL SCIATICA: ICD-10-CM

## 2024-11-26 DIAGNOSIS — M54.42 CHRONIC BILATERAL LOW BACK PAIN WITH BILATERAL SCIATICA: ICD-10-CM

## 2024-11-26 PROCEDURE — 1126F AMNT PAIN NOTED NONE PRSNT: CPT

## 2024-11-26 PROCEDURE — 99395 PREV VISIT EST AGE 18-39: CPT

## 2024-11-26 PROCEDURE — 2014F MENTAL STATUS ASSESS: CPT

## 2024-11-26 PROCEDURE — 1159F MED LIST DOCD IN RCRD: CPT

## 2024-11-26 PROCEDURE — 1160F RVW MEDS BY RX/DR IN RCRD: CPT

## 2024-11-26 PROCEDURE — 90471 IMMUNIZATION ADMIN: CPT

## 2024-11-26 PROCEDURE — 90656 IIV3 VACC NO PRSV 0.5 ML IM: CPT

## 2024-11-26 NOTE — PROGRESS NOTES
Leonela Glez presents to [facility] who presents to the clinic for annual physical.      History of Present Illness  This is a 19-year-old female who presents to clinic for annual physical.    Migraines: Patient states that she overall is doing pretty well, but states that she does feel like she has been having a few more migraines frequently than she was previously.  States that she also had an episode over the summer that did lead her to the hospital.  States that she had some numbness and tingling on 1 side of her body and then ended up developing a really severe headache.  States that she was urged by her brother to go and be evaluated via the ER just in case she was having a stroke, which all workup came back negative and they determined it was just a severe headache.  States that she is not having any other major issues, does state that when she tries to take medication for these over-the-counter that it does not really help, but these are not very often and she states that she may have 1 a month if that.    Patient also states that she is still having some worsening lower back pain.  This is been a chronic issue for her and we did do imaging back in 2022 and states that she notices that the back pain just kind of comes out of nowhere.  There is no triggering events, not worse in the evenings after she is been on her feet for long days, but it is a sharp stabbing pain that will radiate down both of her legs or even shoot up her back.  States that over-the-counter medications do not give a ton of relief either but she would be interested looking at other options    Patient will do Pap smear next visit, refuses vaccine/immunizations, otherwise up-to-date on other preventative screenings    The following portions of the patient's history were personally reviewed and updated as appropriate: allergies, current medications, past medical history, past surgical history, past family  "history, and past social history.       Objective   Vital Signs:   /72 (BP Location: Left arm, Patient Position: Sitting, Cuff Size: Adult)   Pulse 81   Temp 97.4 °F (36.3 °C)   Ht 165.1 cm (65\")   Wt 90.6 kg (199 lb 12.8 oz)   SpO2 96%   BMI 33.25 kg/m²     Body mass index is 33.25 kg/m².    All labs, imaging, test results, and specialty provider notes reviewed with patient.     Physical Exam  Vitals reviewed.   Constitutional:       Appearance: Normal appearance.   Cardiovascular:      Rate and Rhythm: Normal rate and regular rhythm.      Pulses: Normal pulses.      Heart sounds: Normal heart sounds.   Pulmonary:      Effort: Pulmonary effort is normal.      Breath sounds: Normal breath sounds.   Neurological:      General: No focal deficit present.      Mental Status: She is alert and oriented to person, place, and time.                  BMI is >= 30 and <35. (Class 1 Obesity). The following options were offered after discussion;: exercise counseling/recommendations and nutrition counseling/recommendations            Assessment and Plan:  Diagnoses and all orders for this visit:    1. Annual physical exam (Primary)    2. Need for influenza vaccination  -     Fluzone >6mos ([id number])    3. Intractable migraine with aura with status migrainosus  Comments:  Provide as needed Nurtec to see if it improves symptoms associated with migraines.  Orders:  -     Rimegepant Sulfate (NURTEC) 75 MG tablet dispersible tablet; Take 1 tablet by mouth Every Other Day As Needed (migraine).  Dispense: 4 tablet; Refill: 0    4. Chronic bilateral low back pain with bilateral sciatica  Comments:  Refer to physical therapy for strengthening and stretching.  Orders:  -     Ambulatory Referral to Physical Therapy for Evaluation & Treatment      Anticipatory Guidelines discussed with patient.     Discussed getting adequate exercise, reduced TV/electronic time, car safety including seat belt use, sexual activity (if " applicable), and smoking/alcohol use (if applicable).    Follow Up:  Return in about 7 months (around 6/26/2025) for Pap Smear.    Patient was given instructions and counseling regarding her condition or for health maintenance advice. Please see specific information pulled into the AVS if appropriate.
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
## 2024-12-03 ENCOUNTER — OFFICE VISIT (OUTPATIENT)
Dept: PSYCHIATRY | Facility: CLINIC | Age: 19
End: 2024-12-03
Payer: MEDICAID

## 2024-12-03 VITALS
SYSTOLIC BLOOD PRESSURE: 138 MMHG | BODY MASS INDEX: 33.82 KG/M2 | OXYGEN SATURATION: 95 % | WEIGHT: 203 LBS | HEIGHT: 65 IN | HEART RATE: 91 BPM | DIASTOLIC BLOOD PRESSURE: 94 MMHG

## 2024-12-03 DIAGNOSIS — F41.1 GENERALIZED ANXIETY DISORDER: ICD-10-CM

## 2024-12-03 DIAGNOSIS — F51.05 INSOMNIA DUE TO MENTAL CONDITION: ICD-10-CM

## 2024-12-03 DIAGNOSIS — F31.81 MODERATE DEPRESSED BIPOLAR II DISORDER: Primary | ICD-10-CM

## 2024-12-03 NOTE — PLAN OF CARE
Rogerian psychotherapy to help manage depression and anxiety related to family conflict and relationships

## 2025-03-07 ENCOUNTER — TELEMEDICINE (OUTPATIENT)
Dept: PSYCHIATRY | Facility: CLINIC | Age: 20
End: 2025-03-07
Payer: MEDICAID

## 2025-03-07 DIAGNOSIS — F51.05 INSOMNIA DUE TO MENTAL CONDITION: ICD-10-CM

## 2025-03-07 DIAGNOSIS — F41.1 GENERALIZED ANXIETY DISORDER: ICD-10-CM

## 2025-03-07 DIAGNOSIS — F31.81 MODERATE DEPRESSED BIPOLAR II DISORDER: Primary | ICD-10-CM

## 2025-03-07 RX ORDER — LAMOTRIGINE 200 MG/1
200 TABLET ORAL DAILY
Qty: 30 TABLET | Refills: 5 | Status: SHIPPED | OUTPATIENT
Start: 2025-03-07

## 2025-03-07 RX ORDER — QUETIAPINE FUMARATE 100 MG/1
100 TABLET, FILM COATED ORAL NIGHTLY
Qty: 30 TABLET | Refills: 5 | Status: SHIPPED | OUTPATIENT
Start: 2025-03-07
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
## 2025-03-16 DIAGNOSIS — F31.81 MODERATE DEPRESSED BIPOLAR II DISORDER: ICD-10-CM

## 2025-03-16 DIAGNOSIS — F51.05 INSOMNIA DUE TO MENTAL CONDITION: ICD-10-CM

## 2025-03-16 DIAGNOSIS — F41.1 GENERALIZED ANXIETY DISORDER: ICD-10-CM

## 2025-03-17 RX ORDER — QUETIAPINE FUMARATE 100 MG/1
100 TABLET, FILM COATED ORAL NIGHTLY
Qty: 90 TABLET | OUTPATIENT
Start: 2025-03-17

## 2025-03-17 RX ORDER — LAMOTRIGINE 200 MG/1
200 TABLET ORAL DAILY
Qty: 90 TABLET | OUTPATIENT
Start: 2025-03-17

## 2025-03-17 NOTE — TELEPHONE ENCOUNTER
DUPLICATE REFILL REQUESTS.    BOTH MEDICATIONS WERE SENT INTO THE PHARMACY ON 03/07/2025 WITH 5 REFILLS ON THEM.    lamoTRIgine (LaMICtal) 200 MG tablet (03/07/2025)     QUEtiapine (SEROquel) 100 MG tablet (03/07/2025)

## 2025-04-22 ENCOUNTER — OFFICE VISIT (OUTPATIENT)
Dept: FAMILY MEDICINE CLINIC | Facility: CLINIC | Age: 20
End: 2025-04-22
Payer: COMMERCIAL

## 2025-04-22 VITALS
HEIGHT: 65 IN | SYSTOLIC BLOOD PRESSURE: 128 MMHG | TEMPERATURE: 97.5 F | BODY MASS INDEX: 33.34 KG/M2 | WEIGHT: 200.1 LBS | OXYGEN SATURATION: 98 % | HEART RATE: 82 BPM | DIASTOLIC BLOOD PRESSURE: 82 MMHG

## 2025-04-22 DIAGNOSIS — M54.50 MIDLINE LOW BACK PAIN, UNSPECIFIED CHRONICITY, UNSPECIFIED WHETHER SCIATICA PRESENT: Primary | ICD-10-CM

## 2025-04-22 RX ORDER — TIZANIDINE HYDROCHLORIDE 2 MG/1
2 CAPSULE, GELATIN COATED ORAL 3 TIMES DAILY
Qty: 30 CAPSULE | Refills: 0 | Status: SHIPPED | OUTPATIENT
Start: 2025-04-22 | End: 2025-05-02

## 2025-04-22 RX ORDER — KETOROLAC TROMETHAMINE 30 MG/ML
60 INJECTION, SOLUTION INTRAMUSCULAR; INTRAVENOUS ONCE
Status: COMPLETED | OUTPATIENT
Start: 2025-04-22 | End: 2025-04-22

## 2025-04-22 RX ORDER — IBUPROFEN 800 MG/1
800 TABLET, FILM COATED ORAL EVERY 6 HOURS PRN
Qty: 30 TABLET | Refills: 0 | Status: SHIPPED | OUTPATIENT
Start: 2025-04-22 | End: 2025-05-02

## 2025-04-22 RX ADMIN — KETOROLAC TROMETHAMINE 60 MG: 30 INJECTION, SOLUTION INTRAMUSCULAR; INTRAVENOUS at 14:19

## 2025-04-22 NOTE — PROGRESS NOTES
"Chief Complaint  Back Pain (Lower back pain/When standing pain shoots upward, and down left leg)    Subjective      Leonela Glez is a 19 y.o. female who presents to [facility] with complaints of low back pain. She reports history of low back pain but this morning when she got out of the bed the pain worsened. She felt as if the pain shot up her back and down her left leg. While sitting the pain improves. She reports that the pain has improved since this morning. She has not taken anything for the pain. Patient denies saddle anesthesia, bowel or bladder incontinence.       Objective   Vital Signs:   Vitals:    04/22/25 1355   BP: 128/82   BP Location: Left arm   Patient Position: Sitting   Cuff Size: Adult   Pulse: 82   Temp: 97.5 °F (36.4 °C)   SpO2: 98%   Weight: 90.8 kg (200 lb 1.6 oz)   Height: 165.1 cm (65\")     Body mass index is 33.3 kg/m².    Wt Readings from Last 3 Encounters:   04/22/25 90.8 kg (200 lb 1.6 oz)   12/03/24 92.1 kg (203 lb)   11/26/24 90.6 kg (199 lb 12.8 oz)     BP Readings from Last 3 Encounters:   04/22/25 128/82   12/03/24 138/94   11/26/24 120/72       Health Maintenance   Topic Date Due    MENINGOCOCCAL B VACCINE (1 of 2 - Standard) Never done    HEPATITIS C SCREENING  Never done    TDAP/TD VACCINES (1 - Tdap) Never done    PAP SMEAR  Never done    COVID-19 Vaccine (2 - 2024-25 season) 09/01/2024    INFLUENZA VACCINE  07/01/2025    ANNUAL PHYSICAL  11/26/2025    HPV VACCINES  Completed    Pneumococcal Vaccine 0-49  Aged Out    MENINGOCOCCAL VACCINE  Aged Out       Physical Exam  Vitals and nursing note reviewed.   HENT:      Head: Normocephalic and atraumatic.   Eyes:      Conjunctiva/sclera: Conjunctivae normal.   Cardiovascular:      Rate and Rhythm: Normal rate.   Pulmonary:      Effort: Pulmonary effort is normal.   Musculoskeletal:      Cervical back: Normal range of motion.      Lumbar back: Tenderness present. No swelling, edema, signs of " trauma, spasms or bony tenderness. Normal range of motion. Negative right straight leg raise test and negative left straight leg raise test. No scoliosis.   Skin:     General: Skin is warm and dry.   Neurological:      Mental Status: She is alert and oriented to person, place, and time.   Psychiatric:         Mood and Affect: Mood normal.         Behavior: Behavior normal.          Result Review :  The following data was reviewed by: ANA Reddy on 04/22/2025:         Procedures          ASSESSMENT/PLAN  Diagnoses and all orders for this visit:    1. Midline low back pain, unspecified chronicity, unspecified whether sciatica present (Primary)  Comments:  IM toradol in office, Ibuprofen 800 mg to start tomorrow as directed. Tizanidine 2 mg as needed for muscle pain.  Orders:  -     ketorolac (TORADOL) injection 60 mg    Other orders  -     ibuprofen (ADVIL,MOTRIN) 800 MG tablet; Take 1 tablet by mouth Every 6 (Six) Hours As Needed for Mild Pain or Moderate Pain for up to 10 days.  Dispense: 30 tablet; Refill: 0  -     TiZANidine (ZANAFLEX) 2 MG capsule; Take 1 capsule by mouth 3 (Three) Times a Day for 10 days.  Dispense: 30 capsule; Refill: 0                Leonela Glez  reports that she has never smoked. She has never used smokeless tobacco.      FOLLOW UP  Return for Next scheduled follow up.  Patient was given instructions and counseling regarding her condition or for health maintenance advice. Please see specific information pulled into the AVS if appropriate.       ANA Reddy  04/22/25  16:17 EDT

## 2025-05-01 NOTE — TREATMENT PLAN
Multi-Disciplinary Problems (from Behavioral Health Treatment Plan)      Active Problems       Problem: Anxiety  Start Date: 12/03/24      Problem Details: The patient self-scales this problem as a 3 with 10 being the worst.   family conflict and relationships        Goal Priority Start Date Expected End Date End Date    Patient will develop and implement behavioral and cognitive strategies to reduce anxiety and irrational fears. -- 12/03/24 06/03/25 --    Goal Details: Progress toward goal:  improving          Goal Intervention Frequency Start Date End Date    Help patient explore past emotional issues in relation to present anxiety. Q Month 12/03/24 --    Intervention Details: Duration of treatment until remission of symptoms.          Goal Intervention Frequency Start Date End Date    Help patient develop an awareness of their cognitive and physical responses to anxiety. Q Month 12/03/24 --    Intervention Details: Duration of treatment until remission of symptoms.                  Problem: Depression  Start Date: 12/03/24      Problem Details: The patient self-scales this problem as a 3 with 10 being the worst.   family conflict and relationships        Goal Priority Start Date Expected End Date End Date    Patient will demonstrate the ability to initiate new constructive life skills outside of sessions on a consistent basis. -- 12/03/24 06/03/25 --    Goal Details: Progress toward goal:  improving          Goal Intervention Frequency Start Date End Date    Assist patient in setting attainable activities of daily living goals. PRN 12/03/24 --      Goal Intervention Frequency Start Date End Date    Provide education about depression Q Month 12/03/24 --    Intervention Details: Duration of treatment until remission of symptoms.          Goal Intervention Frequency Start Date End Date    Assist patient in developing healthy coping strategies. Q Month 12/03/24 --    Intervention Details: Duration of treatment until  Today's visit was performed with the assistance of  Services.   Name of : Harry ID: [id number]   Service used: Phone : Third Party    Call placed to patient discuss questions     Patient noted that her questions were answered previously  Patient then noted she was wondering if should be concerned for heavy bleeding   Notes changes pad 2-3 times per day     Re-educated patient recommendations per Dr. Hackett \"If develops fevers, chills, worsened pain, or significant bleeding, should let us know.\"  Patient noted understanding    remission of symptoms.                          Reviewed By       Luis Alberto Allred MD 12/03/24 [id number]                     I have discussed and reviewed this treatment plan with the patient.

## 2025-08-05 ENCOUNTER — APPOINTMENT (OUTPATIENT)
Dept: GENERAL RADIOLOGY | Facility: HOSPITAL | Age: 20
End: 2025-08-05
Payer: MEDICAID

## 2025-08-05 ENCOUNTER — HOSPITAL ENCOUNTER (EMERGENCY)
Facility: HOSPITAL | Age: 20
Discharge: HOME | End: 2025-08-05
Attending: EMERGENCY MEDICINE | Admitting: EMERGENCY MEDICINE
Payer: MEDICAID

## 2025-08-05 VITALS
BODY MASS INDEX: 33.06 KG/M2 | HEART RATE: 91 BPM | WEIGHT: 198.41 LBS | TEMPERATURE: 98.1 F | DIASTOLIC BLOOD PRESSURE: 79 MMHG | OXYGEN SATURATION: 96 % | HEIGHT: 65 IN | RESPIRATION RATE: 16 BRPM | SYSTOLIC BLOOD PRESSURE: 116 MMHG

## 2025-08-05 DIAGNOSIS — B34.9 ACUTE VIRAL SYNDROME: Primary | ICD-10-CM

## 2025-08-05 LAB — SARS-COV-2 RNA RESP QL NAA+PROBE: NOT DETECTED

## 2025-08-05 PROCEDURE — 94799 UNLISTED PULMONARY SVC/PX: CPT

## 2025-08-05 PROCEDURE — 99284 EMERGENCY DEPT VISIT MOD MDM: CPT

## 2025-08-05 PROCEDURE — 94640 AIRWAY INHALATION TREATMENT: CPT

## 2025-08-05 PROCEDURE — 93005 ELECTROCARDIOGRAM TRACING: CPT | Performed by: EMERGENCY MEDICINE

## 2025-08-05 PROCEDURE — 87635 SARS-COV-2 COVID-19 AMP PRB: CPT | Performed by: EMERGENCY MEDICINE

## 2025-08-05 PROCEDURE — 71045 X-RAY EXAM CHEST 1 VIEW: CPT

## 2025-08-05 RX ORDER — IPRATROPIUM BROMIDE AND ALBUTEROL SULFATE 2.5; .5 MG/3ML; MG/3ML
3 SOLUTION RESPIRATORY (INHALATION) ONCE
Status: COMPLETED | OUTPATIENT
Start: 2025-08-05 | End: 2025-08-05

## 2025-08-05 RX ORDER — SODIUM CHLORIDE 0.9 % (FLUSH) 0.9 %
10 SYRINGE (ML) INJECTION AS NEEDED
Status: DISCONTINUED | OUTPATIENT
Start: 2025-08-05 | End: 2025-08-05

## 2025-08-05 RX ADMIN — IPRATROPIUM BROMIDE AND ALBUTEROL SULFATE 3 ML: .5; 3 SOLUTION RESPIRATORY (INHALATION) at 19:06

## 2025-08-06 LAB
QT INTERVAL: 347 MS
QTC INTERVAL: 422 MS